# Patient Record
Sex: FEMALE | Race: WHITE | NOT HISPANIC OR LATINO | Employment: PART TIME | URBAN - METROPOLITAN AREA
[De-identification: names, ages, dates, MRNs, and addresses within clinical notes are randomized per-mention and may not be internally consistent; named-entity substitution may affect disease eponyms.]

---

## 2018-05-07 ENCOUNTER — TRANSFERRED RECORDS (OUTPATIENT)
Dept: HEALTH INFORMATION MANAGEMENT | Facility: CLINIC | Age: 28
End: 2018-05-07

## 2018-05-07 ENCOUNTER — HOSPITAL PATHOLOGY (OUTPATIENT)
Dept: OTHER | Facility: CLINIC | Age: 28
End: 2018-05-07

## 2018-05-08 ENCOUNTER — HOSPITAL ENCOUNTER (INPATIENT)
Facility: CLINIC | Age: 28
LOS: 2 days | Discharge: HOME HEALTH | DRG: 776 | End: 2018-05-10
Attending: OBSTETRICS & GYNECOLOGY | Admitting: OBSTETRICS & GYNECOLOGY
Payer: COMMERCIAL

## 2018-05-08 DIAGNOSIS — Z98.891 S/P CESAREAN SECTION: Primary | ICD-10-CM

## 2018-05-08 PROCEDURE — 25000132 ZZH RX MED GY IP 250 OP 250 PS 637: Performed by: STUDENT IN AN ORGANIZED HEALTH CARE EDUCATION/TRAINING PROGRAM

## 2018-05-08 PROCEDURE — 25000128 H RX IP 250 OP 636: Performed by: STUDENT IN AN ORGANIZED HEALTH CARE EDUCATION/TRAINING PROGRAM

## 2018-05-08 PROCEDURE — 12000028 ZZH R&B OB UMMC

## 2018-05-08 RX ORDER — BISACODYL 10 MG
10 SUPPOSITORY, RECTAL RECTAL DAILY PRN
Status: DISCONTINUED | OUTPATIENT
Start: 2018-05-10 | End: 2018-05-10 | Stop reason: HOSPADM

## 2018-05-08 RX ORDER — IBUPROFEN 600 MG/1
600 TABLET, FILM COATED ORAL EVERY 6 HOURS PRN
Qty: 30 TABLET | Refills: 1 | Status: SHIPPED | OUTPATIENT
Start: 2018-05-08

## 2018-05-08 RX ORDER — OXYCODONE HYDROCHLORIDE 5 MG/1
5-10 TABLET ORAL
Status: DISCONTINUED | OUTPATIENT
Start: 2018-05-08 | End: 2018-05-10 | Stop reason: HOSPADM

## 2018-05-08 RX ORDER — ACETAMINOPHEN 325 MG/1
650 TABLET ORAL EVERY 4 HOURS PRN
Qty: 100 TABLET | Refills: 0 | Status: SHIPPED | OUTPATIENT
Start: 2018-05-08

## 2018-05-08 RX ORDER — OXYTOCIN 10 [USP'U]/ML
10 INJECTION, SOLUTION INTRAMUSCULAR; INTRAVENOUS
Status: DISCONTINUED | OUTPATIENT
Start: 2018-05-08 | End: 2018-05-10 | Stop reason: HOSPADM

## 2018-05-08 RX ORDER — LANOLIN 100 %
OINTMENT (GRAM) TOPICAL
Status: DISCONTINUED | OUTPATIENT
Start: 2018-05-08 | End: 2018-05-10 | Stop reason: HOSPADM

## 2018-05-08 RX ORDER — KETOROLAC TROMETHAMINE 30 MG/ML
30 INJECTION, SOLUTION INTRAMUSCULAR; INTRAVENOUS EVERY 6 HOURS
Status: COMPLETED | OUTPATIENT
Start: 2018-05-08 | End: 2018-05-09

## 2018-05-08 RX ORDER — ACETAMINOPHEN 325 MG/1
975 TABLET ORAL EVERY 8 HOURS
Status: DISCONTINUED | OUTPATIENT
Start: 2018-05-08 | End: 2018-05-10 | Stop reason: HOSPADM

## 2018-05-08 RX ORDER — AMOXICILLIN 250 MG
1 CAPSULE ORAL 2 TIMES DAILY
Qty: 60 TABLET | Refills: 1 | Status: SHIPPED | OUTPATIENT
Start: 2018-05-08

## 2018-05-08 RX ORDER — SODIUM CHLORIDE 9 MG/ML
INJECTION, SOLUTION INTRAVENOUS CONTINUOUS
Status: DISCONTINUED | OUTPATIENT
Start: 2018-05-08 | End: 2018-05-10 | Stop reason: HOSPADM

## 2018-05-08 RX ORDER — NALOXONE HYDROCHLORIDE 0.4 MG/ML
.1-.4 INJECTION, SOLUTION INTRAMUSCULAR; INTRAVENOUS; SUBCUTANEOUS
Status: DISCONTINUED | OUTPATIENT
Start: 2018-05-08 | End: 2018-05-10 | Stop reason: HOSPADM

## 2018-05-08 RX ORDER — AMOXICILLIN 250 MG
1 CAPSULE ORAL 2 TIMES DAILY PRN
Status: DISCONTINUED | OUTPATIENT
Start: 2018-05-08 | End: 2018-05-10 | Stop reason: HOSPADM

## 2018-05-08 RX ORDER — OXYTOCIN/0.9 % SODIUM CHLORIDE 30/500 ML
100 PLASTIC BAG, INJECTION (ML) INTRAVENOUS CONTINUOUS
Status: DISCONTINUED | OUTPATIENT
Start: 2018-05-08 | End: 2018-05-10 | Stop reason: HOSPADM

## 2018-05-08 RX ORDER — LIDOCAINE 40 MG/G
CREAM TOPICAL
Status: DISCONTINUED | OUTPATIENT
Start: 2018-05-08 | End: 2018-05-10 | Stop reason: HOSPADM

## 2018-05-08 RX ORDER — MISOPROSTOL 200 UG/1
400 TABLET ORAL
Status: DISCONTINUED | OUTPATIENT
Start: 2018-05-08 | End: 2018-05-10 | Stop reason: HOSPADM

## 2018-05-08 RX ORDER — HYDROCORTISONE 2.5 %
CREAM (GRAM) TOPICAL 3 TIMES DAILY PRN
Status: DISCONTINUED | OUTPATIENT
Start: 2018-05-08 | End: 2018-05-10 | Stop reason: HOSPADM

## 2018-05-08 RX ORDER — DIPHENHYDRAMINE HCL 25 MG
25 CAPSULE ORAL EVERY 6 HOURS PRN
Status: DISCONTINUED | OUTPATIENT
Start: 2018-05-08 | End: 2018-05-10 | Stop reason: HOSPADM

## 2018-05-08 RX ORDER — DIPHENHYDRAMINE HYDROCHLORIDE 50 MG/ML
25 INJECTION INTRAMUSCULAR; INTRAVENOUS EVERY 6 HOURS PRN
Status: DISCONTINUED | OUTPATIENT
Start: 2018-05-08 | End: 2018-05-10 | Stop reason: HOSPADM

## 2018-05-08 RX ORDER — OXYTOCIN/0.9 % SODIUM CHLORIDE 30/500 ML
340 PLASTIC BAG, INJECTION (ML) INTRAVENOUS CONTINUOUS PRN
Status: DISCONTINUED | OUTPATIENT
Start: 2018-05-08 | End: 2018-05-10 | Stop reason: HOSPADM

## 2018-05-08 RX ORDER — ONDANSETRON 2 MG/ML
4 INJECTION INTRAMUSCULAR; INTRAVENOUS EVERY 6 HOURS PRN
Status: DISCONTINUED | OUTPATIENT
Start: 2018-05-08 | End: 2018-05-10 | Stop reason: HOSPADM

## 2018-05-08 RX ORDER — AMOXICILLIN 250 MG
2 CAPSULE ORAL 2 TIMES DAILY PRN
Status: DISCONTINUED | OUTPATIENT
Start: 2018-05-08 | End: 2018-05-10 | Stop reason: HOSPADM

## 2018-05-08 RX ORDER — IBUPROFEN 400 MG/1
400 TABLET, FILM COATED ORAL EVERY 6 HOURS PRN
Status: DISCONTINUED | OUTPATIENT
Start: 2018-05-08 | End: 2018-05-10 | Stop reason: HOSPADM

## 2018-05-08 RX ORDER — ACETAMINOPHEN 325 MG/1
650 TABLET ORAL EVERY 4 HOURS PRN
Status: DISCONTINUED | OUTPATIENT
Start: 2018-05-11 | End: 2018-05-10 | Stop reason: HOSPADM

## 2018-05-08 RX ORDER — IBUPROFEN 800 MG/1
800 TABLET, FILM COATED ORAL EVERY 6 HOURS PRN
Status: DISCONTINUED | OUTPATIENT
Start: 2018-05-08 | End: 2018-05-10 | Stop reason: HOSPADM

## 2018-05-08 RX ORDER — METHYLERGONOVINE MALEATE 0.2 MG/ML
200 INJECTION INTRAVENOUS
Status: DISCONTINUED | OUTPATIENT
Start: 2018-05-08 | End: 2018-05-10 | Stop reason: HOSPADM

## 2018-05-08 RX ORDER — OXYCODONE HYDROCHLORIDE 5 MG/1
5 TABLET ORAL EVERY 6 HOURS PRN
Qty: 12 TABLET | Refills: 0 | Status: SHIPPED | OUTPATIENT
Start: 2018-05-08

## 2018-05-08 RX ORDER — SIMETHICONE 80 MG
80 TABLET,CHEWABLE ORAL 4 TIMES DAILY PRN
Status: DISCONTINUED | OUTPATIENT
Start: 2018-05-08 | End: 2018-05-10 | Stop reason: HOSPADM

## 2018-05-08 RX ORDER — IBUPROFEN 600 MG/1
600 TABLET, FILM COATED ORAL EVERY 6 HOURS PRN
Status: DISCONTINUED | OUTPATIENT
Start: 2018-05-08 | End: 2018-05-10 | Stop reason: HOSPADM

## 2018-05-08 RX ADMIN — KETOROLAC TROMETHAMINE 30 MG: 30 INJECTION, SOLUTION INTRAMUSCULAR at 18:07

## 2018-05-08 RX ADMIN — ACETAMINOPHEN 975 MG: 325 TABLET, FILM COATED ORAL at 23:15

## 2018-05-08 RX ADMIN — SENNOSIDES AND DOCUSATE SODIUM 2 TABLET: 8.6; 5 TABLET ORAL at 23:16

## 2018-05-08 NOTE — IP AVS SNAPSHOT
[facility]    [address] [address]    Phone:  [phone]                                       After Visit Summary   5/8/2018    Ana Leyva    MRN: [id number]           After Visit Summary Signature Page     I have received my discharge instructions, and my questions have been answered. I have discussed any challenges I see with this plan with the nurse or doctor.    ..........................................................................................................................................  Patient/Patient Representative Signature      ..........................................................................................................................................  Patient Representative Print Name and Relationship to Patient    ..................................................               ................................................  Date                                            Time    ..........................................................................................................................................  Reviewed by Signature/Title    ...................................................              ..............................................  Date                                                            Time

## 2018-05-08 NOTE — PLAN OF CARE
Patient arrived to [facility] unit room 7165 via EMS on [facility] at 1600,with belongings and significant other on his way.  Received report from RAAD Washington at [facility] via phone at [phone].  Unit and room orientation started. Call light given; no concerns present at this time. Infant transferred to NICU.  Continue with plan of care.

## 2018-05-08 NOTE — IP AVS SNAPSHOT
MRN:[id number]                      After Visit Summary   2018    Ana Leyva    MRN: [id number]           Thank you!     Thank you for choosing [facility] for your care. Our goal is always to provide you with excellent care. Hearing back from our patients is one way we can continue to improve our services. Please take a few minutes to complete the written survey that you may receive in the mail after you visit with us. Thank you!        Patient Information     Date Of Birth          1990        About your hospital stay     You were [facility] on:  May 8, 2018 [facility]    You were discharged on:  May 10, 2018        Reason for your hospital stay       Maternity care                  Who to Call     For medical emergencies, please call 911.  For non-urgent questions about your medical care, please call your primary care provider or clinic, None          Attending Provider     Provider Specialty    Tayler Barba MD OB/Gyn       Primary Care Provider Fax #    Physician No Ref-Primary [phone]      After Care Instructions     Activity       Review discharge instructions- delivery: No lifting more than 15 pounds for 6 weeks. Nothing in the vagina for 6 weeks, no intercourse for 6 weeks. No strenuous exercise for 6 weeks. No driving until you have stopped taking your pain medications. Call your health care provider if you have any of the following: Fever above 100.4 F; opening or drainage from your incision; soaking a sanitary pad with blood within 1 hour, or you see blood clots larger than a golf ball; malodorous vaginal discharge, severe or worsening pain uncontrolled by your pain medications, nausea and vomiting, severe headaches, changes in vision, calf swelling or pain, shortness of breath, problems coping with sadness, anxiety, or depression. If you have any concerns about hurting yourself or the baby, call your provider immediately. You are  encouraged to call with questions or concerns after you return home.            Diet       Resume previous diet            Discharge Instructions - Postpartum visit       Schedule postpartum visit with your provider and return to clinic in 6 weeks.                  Further instructions from your care team       Postop  Birth Instructions    Activity       Do not lift more than 10 pounds for 6 weeks after surgery.  Ask family and friends for help when you need it.    No driving until you have stopped taking your pain medications (usually two weeks after surgery).    No heavy exercise or activity for 6 weeks.  Don't do anything that will put a strain on your surgery site.    Don't strain when using the toilet.  Your care team may prescribe a stool softener if you have problems with your bowel movements.     To care for your incision:       Keep the incision clean and dry.    Do not soak your incision in water. No swimming or hot tubs until it has fully healed. You may soak in the bathtub if the water level is below your incision.    Do not use peroxide, gel, cream, lotion, or ointment on your incision.    Adjust your clothes to avoid pressure on your surgery site (check the elastic in your underwear for example).     You may see a small amount of clear or pink drainage and this is normal.  Check with your health care provider:       If the drainage increases or has an odor.    If the incision reddens, you have swelling, or develop a rash.    If you have increased pain and the medicine we prescribed doesn't help.    If you have a fever above 100.4 F (38 C) with or without chills when placing thermometer under your tongue.   The area around your incision (surgery wound), will feel numb.  This is normal. The numbness should go away in less than a year.     Keep your hands clean:  Always wash your hands before touching your incision (surgery wound). This helps reduce your risk of infection. If your hands aren't  "dirty, you may use an alcohol hand-rub to clean your hands. Keep your nails clean and short.    Call your healthcare provider if you have any of these symptoms:       You soak a sanitary pad with blood within 1 hour, or you see blood clots larger than a golf ball.    Bleeding that lasts more than 6 weeks.    Vaginal discharge that smells bad.    Severe pain, cramping or tenderness in your lower belly area.    A need to urinate more frequently (use the toilet more often), more urgently (use the toilet very quickly), or it burns when you urinate.    Nausea and vomiting.    Redness, swelling or pain around a vein in your leg.    Problems breastfeeding or a red or painful area on your breast.    Chest pain and cough or are gasping for air.    Problems with coping with sadness, anxiety or depression. If you have concerns about hurting yourself or the baby, call your provider immediately.      You have questions or concerns after you return home.                  Pending Results     No orders found from 5/6/2018 to 5/9/2018.            Statement of Approval     Ordered          05/10/18 1143  I have reviewed and agree with all the recommendations and orders detailed in this document.  EFFECTIVE NOW     Approved and electronically signed by:  Leni Mcdowell MD             Admission Information     Date & Time Provider Department Dept. Phone    5/8/2018 Tayler Barba MD [facility] [phone]      Your Vitals Were     Blood Pressure Temperature Respirations             109/77 98.3  F (36.8  C) (Oral) 16         MyChart Information     [employer] lets you send messages to your doctor, view your test results, renew your prescriptions, schedule appointments and more. To sign up, go to [email or web address] . Click on \"Log in\" on the left side of the screen, which will take you to the Welcome page. Then click on \"Sign up Now\" on the right side of the page.     You will be asked to enter the access code listed " below, as well as some personal information. Please follow the directions to create your username and password.     Your access code is: [email or web address]  Expires: 2018 11:44 AM     Your access code will  in 90 days. If you need help or a new code, please call your [place] clinic or [phone].        Care EveryWhere ID     This is your Care EveryWhere ID. This could be used by other organizations to access your [place] medical records  [id number]        Equal Access to Services     LILI [facility] HospitalT[facility] : Hadii myriam boss hadasho Soomaali, waaxda luqadaha, qaybta kaalmada adeegyada, nga parker . So [facility] [phone].    ATENCIÓN: Si habla español, tiene a mahmood disposición servicios gratuitos de asistencia lingüística. Llame al [phone].    We comply with applicable federal civil rights laws and [place] laws. We do not discriminate on the basis of race, color, national origin, age, disability, sex, sexual orientation, or gender identity.               Review of your medicines      START taking        Dose / Directions    acetaminophen 325 MG tablet   Commonly known as:  TYLENOL        Dose:  650 mg   Take 2 tablets (650 mg) by mouth every 4 hours as needed for mild pain   Quantity:  100 tablet   Refills:  0       ibuprofen 600 MG tablet   Commonly known as:  ADVIL/MOTRIN        Dose:  600 mg   Take 1 tablet (600 mg) by mouth every 6 hours as needed for moderate pain   Quantity:  30 tablet   Refills:  1       oxyCODONE IR 5 MG tablet   Commonly known as:  ROXICODONE        Dose:  5 mg   Take 1 tablet (5 mg) by mouth every 6 hours as needed for severe pain   Quantity:  12 tablet   Refills:  0       senna-docusate 8.6-50 MG per tablet   Commonly known as:  SENOKOT-S;PERICOLACE        Dose:  1 tablet   Take 1 tablet by mouth 2 times daily   Quantity:  60 tablet   Refills:  1            Where to get your medicines      These medications were sent to [facility]  [place], [place] - [address]  [address], [facility] [address]     Phone:  [phone]     acetaminophen 325 MG tablet    ibuprofen 600 MG tablet    senna-docusate 8.6-50 MG per tablet         Some of these will need a paper prescription and others can be bought over the counter. Ask your nurse if you have questions.     Bring a paper prescription for each of these medications     oxyCODONE IR 5 MG tablet                Protect others around you: Learn how to safely use, store and throw away your medicines at www.disposemymeds.org.        Information about OPIOIDS     PRESCRIPTION OPIOIDS: WHAT YOU NEED TO KNOW   You have a prescription for an opioid (narcotic) pain medicine. Opioids can cause addiction. If you have a history of chemical dependency of any type, you are at a higher risk of becoming addicted to opioids. Only take this medicine after all other options have been tried. Take it for as short a time and as few doses as possible.     Do not:    Drive. If you drive while taking these medicines, you could be arrested for driving under the influence (DUI).    Operate heavy machinery    Do any other dangerous activities while taking these medicines.     Drink any alcohol while taking these medicines.      Take with any other medicines that contain acetaminophen. Read all labels carefully. Look for the word  acetaminophen  or  Tylenol.  Ask your pharmacist if you have questions or are unsure.    Store your pills in a secure place, locked if possible. We will not replace any lost or stolen medicine. If you don t finish your medicine, please throw away (dispose) as directed by your pharmacist. The [place] Pollution [employer] has more information about safe disposal: https://www.pca.[facility].[place].us/living-green/managing-unwanted-medications    All opioids tend to cause constipation. Drink plenty of water and eat foods that have a lot of fiber, such as fruits, vegetables, prune juice, apple juice  and high-fiber cereal. Take a laxative (Miralax, milk of magnesia, Colace, Senna) if you don t move your bowels at least every other day.              Medication List: This is a list of all your medications and when to take them. Check marks below indicate your daily home schedule. Keep this list as a reference.      Medications           Morning Afternoon Evening Bedtime As Needed    acetaminophen 325 MG tablet   Commonly known as:  TYLENOL   Take 2 tablets (650 mg) by mouth every 4 hours as needed for mild pain   Last time this was given:  975 mg on 5/10/2018  8:03 AM                                ibuprofen 600 MG tablet   Commonly known as:  ADVIL/MOTRIN   Take 1 tablet (600 mg) by mouth every 6 hours as needed for moderate pain   Last time this was given:  800 mg on 5/10/2018  5:12 AM                                oxyCODONE IR 5 MG tablet   Commonly known as:  ROXICODONE   Take 1 tablet (5 mg) by mouth every 6 hours as needed for severe pain   Last time this was given:  10 mg on 5/10/2018  8:03 AM                                senna-docusate 8.6-50 MG per tablet   Commonly known as:  SENOKOT-S;PERICOLACE   Take 1 tablet by mouth 2 times daily   Last time this was given:  2 tablets on 5/9/2018  9:41 PM

## 2018-05-08 NOTE — PROVIDER NOTIFICATION
05/08/18 1731   Provider Notification   Provider Name/Title Resident   Method of Notification Electronic Page   Request Evaluate in Person   Notification Reason Other   Transfer pt arrived from [facility] at 1600.  Orders needed.  Pt would like to get down to see infant in NICU asap.  Thank you.

## 2018-05-09 LAB — HGB BLD-MCNC: 10.8 G/DL (ref 11.7–15.7)

## 2018-05-09 PROCEDURE — 36415 COLL VENOUS BLD VENIPUNCTURE: CPT | Performed by: OBSTETRICS & GYNECOLOGY

## 2018-05-09 PROCEDURE — 25000132 ZZH RX MED GY IP 250 OP 250 PS 637: Performed by: STUDENT IN AN ORGANIZED HEALTH CARE EDUCATION/TRAINING PROGRAM

## 2018-05-09 PROCEDURE — 25000128 H RX IP 250 OP 636: Performed by: STUDENT IN AN ORGANIZED HEALTH CARE EDUCATION/TRAINING PROGRAM

## 2018-05-09 PROCEDURE — 12000028 ZZH R&B OB UMMC

## 2018-05-09 PROCEDURE — 85018 HEMOGLOBIN: CPT | Performed by: OBSTETRICS & GYNECOLOGY

## 2018-05-09 RX ADMIN — OXYCODONE HYDROCHLORIDE 10 MG: 5 TABLET ORAL at 06:31

## 2018-05-09 RX ADMIN — ACETAMINOPHEN 975 MG: 325 TABLET, FILM COATED ORAL at 23:30

## 2018-05-09 RX ADMIN — OXYCODONE HYDROCHLORIDE 10 MG: 5 TABLET ORAL at 21:40

## 2018-05-09 RX ADMIN — SIMETHICONE CHEW TAB 80 MG 80 MG: 80 TABLET ORAL at 21:41

## 2018-05-09 RX ADMIN — SENNOSIDES AND DOCUSATE SODIUM 2 TABLET: 8.6; 5 TABLET ORAL at 07:51

## 2018-05-09 RX ADMIN — SIMETHICONE CHEW TAB 80 MG 80 MG: 80 TABLET ORAL at 15:09

## 2018-05-09 RX ADMIN — ACETAMINOPHEN 975 MG: 325 TABLET, FILM COATED ORAL at 07:51

## 2018-05-09 RX ADMIN — OXYCODONE HYDROCHLORIDE 10 MG: 5 TABLET ORAL at 11:30

## 2018-05-09 RX ADMIN — ACETAMINOPHEN 975 MG: 325 TABLET, FILM COATED ORAL at 15:09

## 2018-05-09 RX ADMIN — IBUPROFEN 800 MG: 800 TABLET ORAL at 21:41

## 2018-05-09 RX ADMIN — IBUPROFEN 600 MG: 600 TABLET ORAL at 06:31

## 2018-05-09 RX ADMIN — SENNOSIDES AND DOCUSATE SODIUM 2 TABLET: 8.6; 5 TABLET ORAL at 21:41

## 2018-05-09 RX ADMIN — SIMETHICONE CHEW TAB 80 MG 80 MG: 80 TABLET ORAL at 00:33

## 2018-05-09 RX ADMIN — IBUPROFEN 800 MG: 800 TABLET ORAL at 15:56

## 2018-05-09 RX ADMIN — OXYCODONE HYDROCHLORIDE 10 MG: 5 TABLET ORAL at 17:09

## 2018-05-09 RX ADMIN — KETOROLAC TROMETHAMINE 30 MG: 30 INJECTION, SOLUTION INTRAMUSCULAR at 00:06

## 2018-05-09 NOTE — CONSULTS
[facility]  MATERNAL CHILD HEALTH   SOCIAL WORK PROGRESS NOTE      DATA:     Pt is Ana, 28 yr , She delivered a baby girl at [facility] at 39 weeks gestation via  due to decreased fetal movement. Infant was transferred to [facility] NICU due to infant vascular or lymphatic malformation.     SW met briefly with parents at infant's bedside as mom was holding.     Complete psychosocial assessment to follow.    INTERVENTION:     This  introduced myself and my role as their Maternal-Child Health , including role and scope of practice.   SW provided [facility] Guide to SW services and business card.     ASSESSMENT:     Parents were appropriately attentive to pt and receptive to SW introduction.  Parents acknowledged interest in meeting with SW but the timing was not appropriate. No immediate needs or concerns identified.     PLAN:     SW to complete psychosocial assessment tomorrow.       Kjerstin Rydeen, [facility]   Social Worker  Maternal Child Health   Direct: [phone]  Pager: [phone]

## 2018-05-09 NOTE — PROGRESS NOTES
Pt spent most of the shift in NICU. Pumping every 3 hours or so and bringing swabs down to NICU. SO at the bedside and supportive. Pain well controlled. She showered and removed incision dressing; healing well. Mild BLE edema; pt ambulating often. Tolerating a regular diet without issue.

## 2018-05-09 NOTE — H&P
[facility]  L&D History and Physical    No LMP recorded.  Estimated Date of Delivery: Data Unavailable   Gestational age:  Unknown   Obstetric History:              History of Present Illness    Ana Leyva is a 28 year old  POD#1 s/p PLTCS for category II FHT remote from delivery, admitted as a transfer of care due to infant with vascular malformation of left leg. She reports pain is well controlled with Toradol and tylenol. Hasn't needed to use oxycodone. Reports lochia heavier than menses, soaking a pad every 2 hours. She has been eating regular food and has not had nausea or vomiting. She is ambulating without difficulty or lightheadedness. Has not passed gas since surgery, no bowel movements. Breast feeding without concerns/questions.  Voiding without difficulty. Denies chest pain, shortness of breath.    Her pregnancy is complicated by:  1. Obesity, BMI 38    OB history notable for:   Obstetric History       T1      L1     SAB0   TAB0   Ectopic0   Multiple0   Live Births1       # Outcome Date GA Lbr Karl/2nd Weight Sex Delivery Anes PTL Lv   1 Term      CS-LTranv   ALIN                  Past Medical History     Past Medical History:   Diagnosis Date     Obesity             Past Surgical History     Past Surgical History:   Procedure Laterality Date     C/SECTION, LOW TRANSVERSE  2018            Medications     None       Allergies not on file         Social History     Social History   Substance Use Topics     Smoking status: Never Smoker     Smokeless tobacco: Not on file     Alcohol use No            Review of Systems   Pertinent positives and negatives as described above in HPI           Physical Exam     Vitals:    18 1615   BP: 104/74   Resp: 16   Temp: 98.3  F (36.8  C)   TempSrc: Oral   Gen: Pleasant.  Sitting up in bed, in NAD.  Heart:: regular rate and rhythm without murmur, gallop or rub    Lungs: CTA B/L, no wheezing or crackles.  Abdomen: Soft, non-tender.  Tympanic. No rebound or guarding.   Incision: Pfannenstiel incision covered with dressing which is clean, dry, intact.    Extremities: +1 edema         Laboratory/Imaging   Prenatal labs reviewed:    Rh positive, antibody negative  Hg 13.8 (5/)  RPR, HIV, Hep B negative  Rubella immune  GBS postive         Assessment and Plan   Ana Leyva is a 28 year old  POD#1 s/p PLTCS for category II FHT remote from delivery, transferred to the [facility] due to infant with  with vascular malformation of left leg.    Post operative state  - Ambulating, voiding without difficulty.   - Tolerating regular diet, continue bowel regimen    Routine post-partum cares  - Pain: Continue tylenol, ibuprofen, roxicodone PRN  - Heme: Hgb 13.8> > AM hemoglobin ordered  - Baby: In NICU, patient reports baby is doing well other than dilated left leg  - Feeding: Breast  - Contraception: deferred  - Rh positive, Rubella immune     # Pain Assessment:  Current Pain Score 2018   Patient currently in pain? yes   - Ana is experiencing pain due to . Pain management was discussed and the plan was created in a collaborative fashion.  Ana's response to the current recommendations: engaged  - Please see the plan for pain management as documented above      Dispo: d/c to home likely POD#3     Patti Oakes MD   Resident Physician, PGY2  Obstetrics, Gynecology, and Women's Health    I personally examined and evaluated Ana Leyva on 2018 independently from the resident.  I discussed the patient with Dr. Oakes and agree with the assessment and plan of care as documented in the above note with edits by me.     Radha Courtney MD  9:03 AM

## 2018-05-09 NOTE — PLAN OF CARE
Problem: Patient Care Overview  Goal: Plan of Care/Patient Progress Review  Outcome: Improving  Pt is stable this shift. Admit req documents addressed. Pt's baby is in the NICU. Pt visited her baby down there and breast fed her baby. Pt very tired up here and she pumped her breasts first feeding. Called NICU for next feeding time and they said they would call. Pt pumped her breasts again and got drops because NICU said that baby was not feeding at this time. Pain is under control. Pt is able to ambulate to bathroom and void independently.

## 2018-05-09 NOTE — PROVIDER NOTIFICATION
05/08/18 2252   Provider Notification   Provider Name/Title G2 resident Champ   Method of Notification Electronic Page   Request Evaluate-Remote   Notification Reason Other   Pt is just now coming up from NICU.

## 2018-05-09 NOTE — PLAN OF CARE
Problem: Patient Care Overview  Goal: Plan of Care/Patient Progress Review  Outcome: Improving  VSS and postpartum assessments WDL.  Oriented to room and unit routines and welcome folder (birth certificate turned in at [place] prior to transfer, reviewed EDS and ROP).  Up ad betty with steady gait.  Independent with cares.  Christianson catheter removed.  Incisional dressing clean, dry, intact.  Pt pumped and got saturated q-tips in flanges to bring down to infant in NICU.  Pain managed with toradol per MAR.  Significant other, Herberth present and supportive.  Pt went down to visit infant in NICU at 1830 and has spent the entire shift down there.  Resident aware and may touch base with pt in NICU and writer will page resident when pt returns to room.  Will continue with postpartum cares and education per plan of care.  Update:  Pt returned to room at 2300, resident notified.  Pt reported breastfeeding infant in NICU and increase in incisional pain (sat in wheelchair from [id number]), administered scheduled tylenol.  Pt getting up to bathroom to attempt first post christianson removal void.

## 2018-05-09 NOTE — LACTATION NOTE
"D:  I met with Ana.  She is normally in good health, takes no medications, and has no history of breast/chest surgery or trauma.  Petty is her first child. She has already started to pump.   I:  I gave her a folder of introductory materials, reviewed physiology of colostrum and milk production, pumping guidelines, and I gave her a log and encouraged her to use it.  I explained how to access the videos \"Hand Expression\" and \"Maximizing Milk Production\"; as well as other helpful books and websites.  We discussed hands-on pumping techniques and usefulness of a hands-free pumping bra.  We discussed skin to skin holding and how to reach breastfeeding goals.  We talked about medications during breastfeedin.  I advised her to call her insurance company about pump coverage; she has pump coverage through her state sponsored insurance.   A:  Mom has information she needs to initiate her supply.   P:  Will continue to provide lactation support.  Latoya Turcios, RNC, IBCLC           "

## 2018-05-09 NOTE — PROGRESS NOTES
Postpartum Progress Note    Ana Leyva  [id number]    Subjective:   No acute events overnight. Pain is well controlled with oral analgesics. Lochia improved from yesterday, but still heavier than menses.  Eating and drinking regular food without nausea/emesis.  Ambulating without difficulty, lightheadedness. Has not passed gas, no bowel movements. Breast feeding without concerns/questions.  Voiding without difficulty. Denies chest pain, shortness of breath.    Objective:  Vitals:    18 1615 18 0015 18 0744   BP: 104/74 113/73 106/75   Resp:  16 16   Temp: 98.3  F (36.8  C) 97.7  F (36.5  C) 98.4  F (36.9  C)   TempSrc: Oral Oral Oral       I/O last 3 completed shifts:  In: 300 [P.O.:300]  Out: 1900 [Urine:1900]    General: NAD, comfortable  Resp: Breathing room air comfortably   Abdomen: Soft, non-tender, non-distended; fundus firm and at 1cm below umbilicus  Incision:  ABD in place. Dressing clean, dry, intact, no surrounding erythema/fluctuance  Extremities: no edema in BLE    Labs:  Hemoglobin   Date Value Ref Range Status   2018 10.8 (L) 11.7 - 15.7 g/dL Final       Assessment/Plan: 28 year old  who is POD#2 s/p PLTCS due to category II FHT remote from delivery.  Currently stable and doing well.    Post operative state  - Ambulating, voiding without difficulty.   - Tolerating regular diet, continue bowel regimen     Routine post-partum cares  - Pain: Continue tylenol, ibuprofen, roxicodone PRN  - Heme:  Hgb 13.8> > AM hemoglobin ordered  - Baby: In NICU, patient reports baby is doing well other than edematous left leg  - Feeding: Breast  - Contraception: undecided  - Rh positive, Rubella immune      # Pain Assessment:  Current Pain Score 2018   Patient currently in pain? denies   Pain location -   Pain descriptors -   - Ana is experiencing pain due to . Pain management was discussed and the plan was created in a collaborative fashion.  Estefany  response to the current recommendations: engaged  - Please see the plan for pain management as documented above      Dispo: d/c to home likely POD# 3    Patti Oakes MD   Resident Physician, PGY2  Obstetrics, Gynecology, and Women's Health    I personally examined and evaluated Ana Leyva on 5/9/2018 independently from the resident.  I discussed the patient with the team and agree with the assessment and plan of care as documented in the above note with edits by me. Additional comments: appropriate for POD#2, increase ambulation today. Not yet passing flatus but tolerating regular diet. Anticipate DC to home vs NICU boarding status on POD#3.     Radha Courtney MD  9:03 AM

## 2018-05-09 NOTE — DISCHARGE SUMMARY
[facility] Discharge Summary    Ana Leyva MRN# [id number]   Age: 28 year old YOB: 1990     Date of Admission:  5/8/2018  Date of Discharge:  5/10/2018  Admitting Physician:  Tayler Barba MD  Discharge Physician:  Leni Mcdowell MD    Admit Dx:   - POD#1 s/p PLTCS for Category 2 FHT remote from delivery   - Obesity, BMI 38     Discharge Dx:  - Same, except POD#3  - Acute blood loss anemia secondary to procedure    Procedures:  - None    Admit HPI:  Ana Leyva is a 28 year old now  POD#1 s/p PLTCS for Category 2 FHT remote from delivery at 39w0d.  Pregnancy was uncomplicated.  She was transferred from [place] due to her baby being transferred to the NICU due to thrombocytopenia and a vascular lesion.  On arrival, she notes doing well.  Pain is controlled with oral medications.  She is tolerating a regular diet and voiding spontaneously.  Lochia is minimal.  Please see her admit H&P for full details of her PMH, PSH, Meds, Allergies and exam on admit.    Postoperative Course:  Her postoperative course was uncomplicated. On POD#3, she was meeting all of her postpartum goals and deemed stable for discharge. She was voiding without difficulty, tolerating a regular diet without nausea and vomiting, her pain was well controlled on oral pain medicines and her lochia was appropriate. Her hemoglobin prior to delivery was 13.8 and after delivery was 10.8. Her Rh status was positive and Rhogam was not indicated.    Discharge Medications:     Review of your medicines      START taking       Dose / Directions    acetaminophen 325 MG tablet   Commonly known as:  TYLENOL        Dose:  650 mg   Take 2 tablets (650 mg) by mouth every 4 hours as needed for mild pain   Quantity:  100 tablet   Refills:  0       ibuprofen 600 MG tablet   Commonly known as:  ADVIL/MOTRIN        Dose:  600 mg   Take 1 tablet (600 mg) by mouth every 6 hours as needed for moderate pain   Quantity:   30 tablet   Refills:  1       oxyCODONE IR 5 MG tablet   Commonly known as:  ROXICODONE        Dose:  5 mg   Take 1 tablet (5 mg) by mouth every 6 hours as needed for severe pain   Quantity:  12 tablet   Refills:  0       senna-docusate 8.6-50 MG per tablet   Commonly known as:  SENOKOT-S;PERICOLACE        Dose:  1 tablet   Take 1 tablet by mouth 2 times daily   Quantity:  60 tablet   Refills:  1            Where to get your medicines      These medications were sent to [place], [place] - [address]  [address] [address]     Phone:  [phone]      acetaminophen 325 MG tablet     ibuprofen 600 MG tablet     senna-docusate 8.6-50 MG per tablet         Some of these will need a paper prescription and others can be bought over the counter. Ask your nurse if you have questions.     Bring a paper prescription for each of these medications      oxyCODONE IR 5 MG tablet             Discharge/Disposition:  Ana Leyva was discharged to home in stable condition with the following instructions/medications:  1) Call for temperature > 100.4, bright red vaginal bleeding >1 pad an hour x 2 hours, foul smelling vaginal discharge, pain not controlled by usual oral pain meds, persistent nausea and vomiting not controlled on medications, drainage or redness from incision site  2) She was undecided for contraception and will follow-up postpartum.  3) For feeding she decided to breastfeed.  4) She was instructed to follow-up with her primary OB in 6 weeks for a routine postpartum visit.  5) Discharge activity:  No heavy lifting >15 lbs or strenuous activity for 6 weeks, pelvic rest for 6 weeks, no driving or operating machinery while on narcotics.    # Discharge Pain Plan:   - During her hospitalization, Ana experienced pain due to  section.  The pain plan for discharge was discussed with Ana and the plan was created in a collaborative fashion.    - Opioids  prescribed on discharge: Roxicodone  - Duration of opioids after discharge: 5 days  - Bowel regimen: cole Oakes MD   Resident Physician, PGY2  Obstetrics, Gynecology, and Women's Health    The patient was seen and examined by me on the day of discharge.  I have reviewed and agree with the above note.    Leni Mcdowell MD, FACOG

## 2018-05-10 VITALS — TEMPERATURE: 98.3 F | DIASTOLIC BLOOD PRESSURE: 77 MMHG | RESPIRATION RATE: 16 BRPM | SYSTOLIC BLOOD PRESSURE: 109 MMHG

## 2018-05-10 PROCEDURE — 25000132 ZZH RX MED GY IP 250 OP 250 PS 637: Performed by: STUDENT IN AN ORGANIZED HEALTH CARE EDUCATION/TRAINING PROGRAM

## 2018-05-10 RX ADMIN — OXYCODONE HYDROCHLORIDE 10 MG: 5 TABLET ORAL at 13:55

## 2018-05-10 RX ADMIN — SENNOSIDES AND DOCUSATE SODIUM 2 TABLET: 8.6; 5 TABLET ORAL at 13:56

## 2018-05-10 RX ADMIN — OXYCODONE HYDROCHLORIDE 10 MG: 5 TABLET ORAL at 05:12

## 2018-05-10 RX ADMIN — OXYCODONE HYDROCHLORIDE 10 MG: 5 TABLET ORAL at 08:03

## 2018-05-10 RX ADMIN — IBUPROFEN 800 MG: 800 TABLET ORAL at 05:12

## 2018-05-10 RX ADMIN — ACETAMINOPHEN 975 MG: 325 TABLET, FILM COATED ORAL at 08:03

## 2018-05-10 RX ADMIN — SIMETHICONE CHEW TAB 80 MG 80 MG: 80 TABLET ORAL at 05:12

## 2018-05-10 NOTE — PLAN OF CARE
Problem: Postpartum ( Delivery) (Adult,Obstetrics,Pediatric)  Goal: Signs and Symptoms of Listed Potential Problems Will be Absent, Minimized or Managed (Postpartum)  Signs and symptoms of listed potential problems will be absent, minimized or managed by discharge/transition of care (reference Postpartum ( Delivery) (Adult,Obstetrics,Pediatric) CPG).   Outcome: Adequate for Discharge Date Met: 05/10/18  AVSS. Postpartum checks wnl. Pain managed on scheduled Tylenol. Mom to board since baby is in the NICU. Mom has been down in the NICU to breastfeed infant. Spouse at bedside, attentive to patient.  Adequate for discharge.

## 2018-05-10 NOTE — PLAN OF CARE
Problem: Patient Care Overview  Goal: Plan of Care/Patient Progress Review  Outcome: Improving  VSS. Pt sleeping most of shift. Pumping with encouragement; getting drops to swab up. Ambulating independently; visiting baby in NICU. Voiding without difficulty. Pain controlled with tylenol, ibuprofen, oxycodone and simethicone. Dad at bedside for support. Continue with plan of care.

## 2018-05-10 NOTE — PROGRESS NOTES
[facility] CHILDREN'S \[facility] of [place] [facility]\""  MATERNAL CHILD HEALTH   INITIAL NICU PSYCHOSOCIAL ASSESSMENT      DATA:      Presenting Information: Pt, is Ana. She is 28 yrs, .  She delivered daughter, Petty, at [facility] via  due to decreased fetal movement at 39 week gestation. She was transferred to [facility] NICU due to infant vascular or lymphatic malformation.     She is engaged to partner/FOB, Herberth.      Living Situation: Pt lives with live partner, and his younger brother in [place]. They spend the person in [place].      Family Constellation: Some extended family still lives in [place]. Cut off relationships have occurred with some members of their family of origins/ extended family due to [employer] beliefs.      Social Support: Parents have a close couple friends and family members for support.      Education and Employment: Herberth has completed is GED and is employed full-time in construction. He has flexibility to take time off but as he does not get paid time off he is hopeful to return to work soon. Ana has had some education into the 8th grade and has begun pursuing her GED. She has not been working outside of the home and will be home as Petty's primary caregiver.      Finances and Insurance: Family has [employer] [facility]. They are aware of the process to add Petty to the plan.      Mental Health History and Current Coping: Both Ana and Herberth were raised in [employer] and decided to leave the Congregation. Due to the complexities of relationships ending with those that remain in the Congregation it was a decision that was not taken lightly. Ana acknowledges that due to the nature of the life change and adjustment she is uncertain if that would quantify for depression. Parents acknowledge that their upbringing has shaped and influenced them to be able to accept what is occurring and adjust as needed.      Community  Resources//Baby Supplies: Family reports that they have all necessary baby items.         INTERVENTION:        SW completed chart review and collaborated with the multidisciplinary team.     Psychosocial Assessment     Introduction to Maternal Child Health  role and scope of practice     Orientation to the NICU (parking, lodging/NICU boarding rooms)    Reviewed Hospital and Community Resources     Assessed Mental Health History and Current Symptoms     Identified stressors, barriers and family concerns     Provided psychoeducation on  mood and anxiety disorders, assessed for any current symptoms or history    Provided PPSMN brochure    Supportive Counseling      ASSESSMENT:      Coping: adequate     Affect: appropriate: tearful and smiling at appropriate content of conversation.     Motivation/Ability to Access Services: Highly motivated, independent in accessing services.      Assessment of Support System: stable, limited, adequate.     Level of engagement with SW: readily engage with SW.      Family s understanding of baby s medical situation: appropriate understanding, awaiting additional information at the time of my visit.      Family and parent/infant interactions: Parents appear to have a strong relationship and feel very supported by each other.      Assessment of parental risk for PMAD: risk due to unanticipated NICU admission, possible depression history, protective factors of effective coping, adequate stable support system.         PLAN:      SW will continue to follow throughout patient s Maternal-Child Health Journey as needs arise. SW will continue to collaborate with the multidisciplinary team.     Kjerstin Rydeen, [facility]   Social Worker  Maternal Child Health   Direct: [phone]  Pager: [phone]

## 2018-05-10 NOTE — PLAN OF CARE
Problem: Patient Care Overview  Goal: Plan of Care/Patient Progress Review  Outcome: Improving  VSS and postpartum assessments WDL.  Up ad betty with steady gait.  Independent with cares.  Pt pumping and also going down to feed infant in NICU.  Reinforced education and assistance with hand expression after pumping.  Pt went down to NICU to visit and feed infant at 1800 and has been down there for much of the shift.  Pain managed with tylenol, ibuprofen, oxycodone and simethicone per MAR.  Incisional staples and steristrips intact.  Friend visiting and significant other, Herberth both supportive to pt.  Will continue with postpartum cares and education per plan of care.

## 2018-05-10 NOTE — PROGRESS NOTES
Postpartum Progress Note    Ana Leyva  [id number]    Subjective:   No acute events overnight. Pain is well controlled with oral ibuprofen, roxicodone PRNl. Lochia similar to menses.  Eating and drinking regular food without nausea/emesis.  Ambulating without difficulty, lightheadedness. Has passed gas, no bowel movements. Breast feeding without concerns/questions.  Voiding without difficulty. Denies chest pain, shortness of breath.    Objective:  Vitals:    18 0744 18 1600 18 2340 05/10/18 0800   BP: 106/75 110/65 96/59 109/77   Resp: 16 18 16 16   Temp: 98.4  F (36.9  C) 98.1  F (36.7  C) 98.5  F (36.9  C) 98.3  F (36.8  C)   TempSrc: Oral Oral Oral Oral     General: NAD, comfortable  Resp: Breathing room air comfortably   Abdomen: Soft, non-tender, non-distended; fundus firm and at 1 cm below umbilicus  Incision:  Dressing in place. Clean, dry, intact, no surrounding erythema/fluctuance  Extremities: 1+ non-pitting edema in BLE    Labs:  Hemoglobin   Date Value Ref Range Status   2018 10.8 (L) 11.7 - 15.7 g/dL Final       Assessment/Plan: 28 year old  who is POD#3 s/p PLTCS due to category II FHT remote from delivery.  Currently stable and doing well.     Post operative state  - Ambulating, voiding without difficulty.   - Tolerating regular diet, continue bowel regimen      Routine post-partum cares  - Pain: Continue tylenol, ibuprofen, roxicodone PRN  - Heme:  Hgb 13.8> > 10.8  - Baby: In NICU, patient reports baby is doing well other than edematous left leg  - Feeding: Breast  - Contraception: undecided  - Rh positive, Rubella immune     # Pain Assessment:  Current Pain Score 5/10/2018   Patient currently in pain? denies   Pain location Incision   Pain descriptors Discomfort;Sore   - Ana is experiencing pain due to . Pain management was discussed and the plan was created in a collaborative fashion.  Ana's response to the current recommendations:  engaged  - Please see the plan for pain management as documented above      Dispo: d/c to home likely today, pending family's discussion with social work.    Patti Oakes MD   Resident Physician, PGY2  Obstetrics, Gynecology, and Women's Health    The patient was seen and examined by me separately from the team.  I have reviewed and agree with the above note.  She is doing well, ready to discharge to a boarding room today.  She can follow up with her primary OB for her postpartum visit.    Leni Mcdowell MD, FACOG

## 2018-05-10 NOTE — LACTATION NOTE
D:  I met with Ana.  I:  I dispensed a Pump in Style and instructed her in its use.  I reviewed hand expression, hands-free pumping bra (has it at home, will get it) and logging (is using the paper log).  She was happy to get her 1st puddle.  She will call for help with latching; we discussed some timing tips (trying to latch when calm, extended skin to skin before feed, etc).  A:  Mom has information and equipment she needs to initiate her supply.  P:  Will continue to provide lactation support.    Dona Hays, RNC, IBCLC

## 2018-05-14 DIAGNOSIS — Z82.79 FAMILY HISTORY OF CONGENITAL ANOMALIES: Primary | ICD-10-CM

## 2018-05-14 PROCEDURE — 88342 IMHCHEM/IMCYTCHM 1ST ANTB: CPT | Performed by: DERMATOLOGY

## 2018-05-14 PROCEDURE — 00000346 ZZHCL STATISTIC REVIEW OUTSIDE SLIDES TC 88321: Performed by: DERMATOLOGY

## 2018-05-14 PROCEDURE — 81404 MOPATH PROCEDURE LEVEL 5: CPT | Performed by: MEDICAL GENETICS

## 2018-05-14 PROCEDURE — 00000159 ZZHCL STATISTIC H-SEND OUTS PREP: Performed by: DERMATOLOGY

## 2018-05-14 PROCEDURE — 81311 NRAS GENE VARIANTS EXON 2&3: CPT | Performed by: MEDICAL GENETICS

## 2018-05-14 PROCEDURE — 88341 IMHCHEM/IMCYTCHM EA ADD ANTB: CPT | Performed by: DERMATOLOGY

## 2018-05-15 DIAGNOSIS — Z82.79 FAMILY HISTORY OF CONGENITAL ANOMALIES: ICD-10-CM

## 2018-05-23 LAB — COPATH REPORT: NORMAL

## 2018-08-16 ENCOUNTER — HOSPITAL ENCOUNTER (OUTPATIENT)
Facility: CLINIC | Age: 28
Setting detail: SPECIMEN
Discharge: HOME | End: 2018-08-16
Admitting: PATHOLOGY
Payer: COMMERCIAL

## 2018-08-16 PROCEDURE — 88271 CYTOGENETICS DNA PROBE: CPT | Performed by: PATHOLOGY

## 2018-08-16 PROCEDURE — 88275 CYTOGENETICS 100-300: CPT | Performed by: PATHOLOGY

## 2018-08-23 LAB
COPATH REPORT: NORMAL
COPATH REPORT: NORMAL

## 2018-08-24 LAB — COPATH REPORT: NORMAL

## 2022-09-23 ENCOUNTER — OFFICE VISIT (OUTPATIENT)
Dept: ORTHOPEDICS | Facility: CLINIC | Age: 32
End: 2022-09-23
Payer: COMMERCIAL

## 2022-09-23 ENCOUNTER — PRE VISIT (OUTPATIENT)
Dept: ORTHOPEDICS | Facility: CLINIC | Age: 32
End: 2022-09-23

## 2022-09-23 DIAGNOSIS — S69.92XA INJURY OF LEFT WRIST, INITIAL ENCOUNTER: Primary | ICD-10-CM

## 2022-09-23 PROCEDURE — 99203 OFFICE O/P NEW LOW 30 MIN: CPT | Performed by: FAMILY MEDICINE

## 2022-09-23 NOTE — LETTER
9/23/2022         RE: Ana Leyva  [address]  [place] [facility] [address]        Dear Colleague,    Thank you for referring your patient, Ana Leyva, to the [facility] [place]. Please see a copy of my visit note below.      [facility]  SPORTS MEDICINE CLINIC VISIT     Sep 23, 2022        ASSESSMENT & PLAN    31 yo with left ECU tendinitis    Reviewed imaging and assessment with patient in detail  Discussed bracing, use of topical/oral nsaids, icing  Given home exercises  Follow up in 3-4 wks if not improving.     Iker Rueda MD  [facility] SPORTS [facility]    -----  Chief Complaint   Patient presents with     Consult For     Left wrist pain        SUBJECTIVE  Ana Leyva is a/an 32 year old female who is seen as a self referral for evaluation of left wrist pain.     The patient is seen by themselves.  The patient is Right handed    Onset: 2 day(s) ago. Patient describes injury as lifting a heavy box and noticed shooting pain after that.   Location of Pain: left wrist - ulnar aspect of wrist  Worsened by: pronation and supination, lifting, grasping  Better with: Nothing  Treatments tried: no treatment tried to date  Associated symptoms: swelling    Orthopedic/Surgical history: NO  Social History/Occupation: Clean, advertising       REVIEW OF SYSTEMS:    Do you have fever, chills, weight loss? No    Do you have any vision problems? No    Do you have any chest pain or edema? No    Do you have any shortness of breath or wheezing?  No    Do you have stomach problems? No    Do you have any numbness or focal weakness? No    Do you have diabetes? No    Do you have problems with bleeding or clotting? No    Do you have an rashes or other skin lesions? No    OBJECTIVE:  There were no vitals taken for this visit.     General  - alert, pleasant, no distress  CV  - normal radial pulse, cap refill brisk  Musculoskeletal - left wrist  - inspection:  normal joint alignment, no obvious deformity, no swelling  - palpation: ttp over ulnar styloid and fovea, no bony or soft tissue tenderness, no tenderness at the anatomical snuffbox  - ROM:  90 deg flexion   70 deg extension   25 deg abduction   65 deg adduction  - strength: 5/5  strength, 5/5 flexion, extension, pronation, supination, adduction, abduction  - special tests:  Pain with ulnar deviation agaisnt resistance  No pain with loading extended wrist    Neuro  - no sensory or motor deficit, grossly normal coordination, normal muscle tone  Skin  - no ecchymosis, erythema, warmth, or induration, no obvious rash          RADIOLOGY:    No imaging this visit            Again, thank you for allowing me to participate in the care of your patient.        Sincerely,        Iker Rueda MD

## 2022-09-23 NOTE — PROGRESS NOTES
[facility]  SPORTS MEDICINE CLINIC VISIT     Sep 23, 2022        ASSESSMENT & PLAN    31 yo with left ECU tendinitis    Reviewed imaging and assessment with patient in detail  Discussed bracing, use of topical/oral nsaids, icing  Given home exercises  Follow up in 3-4 wks if not improving.     Iker Rueda MD  [facility] SPORTS MEDICINE [facility]    -----  Chief Complaint   Patient presents with     Consult For     Left wrist pain        SUBJECTIVE  Ana Leyva is a/an 32 year old female who is seen as a self referral for evaluation of left wrist pain.     The patient is seen by themselves.  The patient is Right handed    Onset: 2 day(s) ago. Patient describes injury as lifting a heavy box and noticed shooting pain after that.   Location of Pain: left wrist - ulnar aspect of wrist  Worsened by: pronation and supination, lifting, grasping  Better with: Nothing  Treatments tried: no treatment tried to date  Associated symptoms: swelling    Orthopedic/Surgical history: NO  Social History/Occupation: Clean, advertising       REVIEW OF SYSTEMS:    Do you have fever, chills, weight loss? No    Do you have any vision problems? No    Do you have any chest pain or edema? No    Do you have any shortness of breath or wheezing?  No    Do you have stomach problems? No    Do you have any numbness or focal weakness? No    Do you have diabetes? No    Do you have problems with bleeding or clotting? No    Do you have an rashes or other skin lesions? No    OBJECTIVE:  There were no vitals taken for this visit.     General  - alert, pleasant, no distress  CV  - normal radial pulse, cap refill brisk  Musculoskeletal - left wrist  - inspection: normal joint alignment, no obvious deformity, no swelling  - palpation: ttp over ulnar styloid and fovea, no bony or soft tissue tenderness, no tenderness at the anatomical snuffbox  - ROM:  90 deg flexion   70 deg extension   25 deg abduction   65 deg adduction  -  strength: 5/5  strength, 5/5 flexion, extension, pronation, supination, adduction, abduction  - special tests:  Pain with ulnar deviation agaisnt resistance  No pain with loading extended wrist    Neuro  - no sensory or motor deficit, grossly normal coordination, normal muscle tone  Skin  - no ecchymosis, erythema, warmth, or induration, no obvious rash          RADIOLOGY:    No imaging this visit

## 2022-09-23 NOTE — PATIENT INSTRUCTIONS
Wrist Stretching/Strengthening Exercises    STRETCHING EXERCISES    Wrist range of motion  Flexion: Gently bend your wrist forward. Hold for 5 seconds. Do 2 sets of 15.  Extension: Gently bend your wrist backward. Hold this position 5 seconds. Do 2 sets of 15.    Side to side: Gently move your wrist from side to side (a handshake motion). Hold for 5 seconds in each direction. Do 2 sets of 15.  Wrist stretch: Press the back of the hand on your injured side with your other hand to help bend your wrist. Hold for 15 to 30 seconds. Next, stretch the hand back by pressing the fingers in a backward direction. Hold for 15 to 30 seconds. Keep the arm on your injured side straight during this exercise. Do 3 sets.    Wrist extension stretch: Stand at a table with your palms down, fingers flat, and elbows straight. Lean your body weight forward. Hold this position for 15 seconds. Repeat 3 times.    Wrist flexion stretch: Stand with the back of your hands on a table, palms facing up, fingers pointing toward your body, and elbows straight. Lean away from the table. Hold this position for 15 to 30 seconds. Repeat 3 times.    Forearm pronation and supination: Bend the elbow of your injured arm 90 degrees, keeping your elbow at your side. Turn your palm up and hold for 5 seconds. Then slowly turn your palm down and hold for 5 seconds. Make sure you keep your elbow at your side and bent 90 degrees while you do the exercise. Do 2 sets of 15.    When this exercise becomes pain free, do it with some weight in your hand such as a soup can or hammer handle.    STRENGTHENING EXERCISES    Wrist flexion: Hold a can or hammer handle in your hand with your palm facing up. Bend your wrist upward. Slowly lower the weight and return to the starting position. Do 2 sets of 15. Gradually increase the weight of the can or weight you are holding.    Wrist extension: Hold a soup can or hammer handle in your hand with your palm facing down. Slowly bend  your wrist up. Slowly lower the weight down into the starting position. Do 2 sets of 15. Gradually increase the weight of the object you are holding.     strengthening: Squeeze a soft rubber ball and hold the squeeze for 5 seconds. Do 2 sets of 15.    Developed by [employer].  Published by [employer].  Copyright  2014 [employer] and/or one of its subsidiaries. All rights reserved.

## 2022-09-23 NOTE — TELEPHONE ENCOUNTER
DIAGNOSIS: I m having shooting pain in my left wrist. Slightly swollen.   APPOINTMENT DATE: 09/23/2022   NOTES STATUS DETAILS   OFFICE NOTE from referring provider N/A    OFFICE NOTE from other specialist N/A    DISCHARGE SUMMARY from hospital N/A    DISCHARGE REPORT from the ER N/A    OPERATIVE REPORT N/A    EMG report N/A    MEDICATION LIST N/A    MRI N/A    DEXA (osteoporosis/bone health) N/A    CT SCAN N/A    XRAYS (IMAGES & REPORTS) N/A

## 2023-01-28 ENCOUNTER — HEALTH MAINTENANCE LETTER (OUTPATIENT)
Age: 33
End: 2023-01-28

## 2023-05-04 ENCOUNTER — LAB REQUISITION (OUTPATIENT)
Dept: LAB | Facility: CLINIC | Age: 33
End: 2023-05-04

## 2023-05-04 DIAGNOSIS — E66.01 MORBID (SEVERE) OBESITY DUE TO EXCESS CALORIES (H): ICD-10-CM

## 2023-05-04 DIAGNOSIS — Z36.9 ENCOUNTER FOR ANTENATAL SCREENING, UNSPECIFIED: ICD-10-CM

## 2023-05-04 LAB
BASOPHILS # BLD AUTO: 0 10E3/UL (ref 0–0.2)
BASOPHILS NFR BLD AUTO: 0 %
EOSINOPHIL # BLD AUTO: 0.4 10E3/UL (ref 0–0.7)
EOSINOPHIL NFR BLD AUTO: 3 %
ERYTHROCYTE [DISTWIDTH] IN BLOOD BY AUTOMATED COUNT: 13.8 % (ref 10–15)
HBA1C MFR BLD: 5.4 %
HCT VFR BLD AUTO: 40.1 % (ref 35–47)
HGB BLD-MCNC: 12.9 G/DL (ref 11.7–15.7)
IMM GRANULOCYTES # BLD: 0.1 10E3/UL
IMM GRANULOCYTES NFR BLD: 1 %
LYMPHOCYTES # BLD AUTO: 3.1 10E3/UL (ref 0.8–5.3)
LYMPHOCYTES NFR BLD AUTO: 26 %
MCH RBC QN AUTO: 30.4 PG (ref 26.5–33)
MCHC RBC AUTO-ENTMCNC: 32.2 G/DL (ref 31.5–36.5)
MCV RBC AUTO: 94 FL (ref 78–100)
MONOCYTES # BLD AUTO: 0.8 10E3/UL (ref 0–1.3)
MONOCYTES NFR BLD AUTO: 7 %
NEUTROPHILS # BLD AUTO: 7.7 10E3/UL (ref 1.6–8.3)
NEUTROPHILS NFR BLD AUTO: 63 %
NRBC # BLD AUTO: 0 10E3/UL
NRBC BLD AUTO-RTO: 0 /100
PLATELET # BLD AUTO: 271 10E3/UL (ref 150–450)
RBC # BLD AUTO: 4.25 10E6/UL (ref 3.8–5.2)
WBC # BLD AUTO: 12 10E3/UL (ref 4–11)

## 2023-05-04 PROCEDURE — 86803 HEPATITIS C AB TEST: CPT | Performed by: NURSE PRACTITIONER

## 2023-05-04 PROCEDURE — 86850 RBC ANTIBODY SCREEN: CPT | Performed by: NURSE PRACTITIONER

## 2023-05-04 PROCEDURE — 87491 CHLMYD TRACH DNA AMP PROBE: CPT | Performed by: NURSE PRACTITIONER

## 2023-05-04 PROCEDURE — 87340 HEPATITIS B SURFACE AG IA: CPT | Performed by: NURSE PRACTITIONER

## 2023-05-04 PROCEDURE — 86762 RUBELLA ANTIBODY: CPT | Performed by: NURSE PRACTITIONER

## 2023-05-04 PROCEDURE — 87591 N.GONORRHOEAE DNA AMP PROB: CPT | Performed by: NURSE PRACTITIONER

## 2023-05-04 PROCEDURE — 83036 HEMOGLOBIN GLYCOSYLATED A1C: CPT | Performed by: NURSE PRACTITIONER

## 2023-05-04 PROCEDURE — 87086 URINE CULTURE/COLONY COUNT: CPT | Performed by: NURSE PRACTITIONER

## 2023-05-04 PROCEDURE — 87389 HIV-1 AG W/HIV-1&-2 AB AG IA: CPT | Performed by: NURSE PRACTITIONER

## 2023-05-04 PROCEDURE — 86592 SYPHILIS TEST NON-TREP QUAL: CPT | Performed by: NURSE PRACTITIONER

## 2023-05-04 PROCEDURE — 85004 AUTOMATED DIFF WBC COUNT: CPT | Performed by: NURSE PRACTITIONER

## 2023-05-05 LAB
ABO/RH(D): NORMAL
ANTIBODY SCREEN: NEGATIVE
C TRACH DNA SPEC QL PROBE+SIG AMP: NEGATIVE
HBV SURFACE AG SERPL QL IA: NONREACTIVE
HCV AB SERPL QL IA: NONREACTIVE
HIV 1+2 AB+HIV1 P24 AG SERPL QL IA: NONREACTIVE
N GONORRHOEA DNA SPEC QL NAA+PROBE: NEGATIVE
RPR SER QL: NONREACTIVE
SPECIMEN EXPIRATION DATE: NORMAL

## 2023-05-06 LAB — BACTERIA UR CULT: NO GROWTH

## 2023-05-09 LAB
RUBV IGG SERPL QL IA: 1.4 INDEX
RUBV IGG SERPL QL IA: POSITIVE

## 2023-09-05 ENCOUNTER — LAB REQUISITION (OUTPATIENT)
Dept: LAB | Facility: CLINIC | Age: 33
End: 2023-09-05
Payer: COMMERCIAL

## 2023-09-05 DIAGNOSIS — Z36.89 ENCOUNTER FOR OTHER SPECIFIED ANTENATAL SCREENING: ICD-10-CM

## 2023-09-05 DIAGNOSIS — Z36.85 ENCOUNTER FOR ANTENATAL SCREENING FOR STREPTOCOCCUS B: ICD-10-CM

## 2023-09-05 PROCEDURE — 87653 STREP B DNA AMP PROBE: CPT | Mod: ORL | Performed by: NURSE PRACTITIONER

## 2023-09-06 LAB — GP B STREP DNA SPEC QL NAA+PROBE: POSITIVE

## 2023-11-10 ENCOUNTER — LAB REQUISITION (OUTPATIENT)
Dept: LAB | Facility: CLINIC | Age: 33
End: 2023-11-10

## 2023-11-10 DIAGNOSIS — Z12.4 ENCOUNTER FOR SCREENING FOR MALIGNANT NEOPLASM OF CERVIX: ICD-10-CM

## 2023-11-10 PROCEDURE — 87624 HPV HI-RISK TYP POOLED RSLT: CPT | Performed by: NURSE PRACTITIONER

## 2023-11-10 PROCEDURE — G0145 SCR C/V CYTO,THINLAYER,RESCR: HCPCS | Performed by: NURSE PRACTITIONER

## 2023-11-15 LAB
BKR LAB AP GYN ADEQUACY: NORMAL
BKR LAB AP GYN INTERPRETATION: NORMAL
BKR LAB AP HPV REFLEX: NORMAL
BKR LAB AP LMP: NORMAL
BKR LAB AP PREVIOUS ABNL DX: NORMAL
BKR LAB AP PREVIOUS ABNORMAL: NORMAL
PATH REPORT.COMMENTS IMP SPEC: NORMAL
PATH REPORT.COMMENTS IMP SPEC: NORMAL
PATH REPORT.RELEVANT HX SPEC: NORMAL

## 2023-11-17 LAB
HUMAN PAPILLOMA VIRUS 16 DNA: NEGATIVE
HUMAN PAPILLOMA VIRUS 18 DNA: NEGATIVE
HUMAN PAPILLOMA VIRUS FINAL DIAGNOSIS: NORMAL
HUMAN PAPILLOMA VIRUS OTHER HR: NEGATIVE

## 2024-02-25 ENCOUNTER — HEALTH MAINTENANCE LETTER (OUTPATIENT)
Age: 34
End: 2024-02-25

## 2025-03-09 ENCOUNTER — HEALTH MAINTENANCE LETTER (OUTPATIENT)
Age: 35
End: 2025-03-09